# Patient Record
Sex: MALE | Race: WHITE
[De-identification: names, ages, dates, MRNs, and addresses within clinical notes are randomized per-mention and may not be internally consistent; named-entity substitution may affect disease eponyms.]

---

## 2019-06-24 ENCOUNTER — HOSPITAL ENCOUNTER (OUTPATIENT)
Dept: HOSPITAL 92 - SCSMRI | Age: 34
Discharge: HOME | End: 2019-06-24
Attending: FAMILY MEDICINE
Payer: COMMERCIAL

## 2019-06-24 DIAGNOSIS — E29.1: Primary | ICD-10-CM

## 2019-06-24 PROCEDURE — 84436 ASSAY OF TOTAL THYROXINE: CPT

## 2019-06-24 PROCEDURE — 84146 ASSAY OF PROLACTIN: CPT

## 2019-06-24 PROCEDURE — 36415 COLL VENOUS BLD VENIPUNCTURE: CPT

## 2019-06-24 PROCEDURE — 84466 ASSAY OF TRANSFERRIN: CPT

## 2019-06-24 PROCEDURE — A9577 INJ MULTIHANCE: HCPCS

## 2019-06-24 PROCEDURE — 83540 ASSAY OF IRON: CPT

## 2019-06-24 PROCEDURE — 70553 MRI BRAIN STEM W/O & W/DYE: CPT

## 2019-06-24 PROCEDURE — 82533 TOTAL CORTISOL: CPT

## 2019-06-24 NOTE — MRI
MRI BRAIN AND SELLA WITH AND WITHOUT CONTRAST:

 

DATE:  06/24/19 

 

HISTORY:  

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 

Low serum testosterone level. 

 

TECHNIQUE:

Multiple sequences obtained in axial, sagittal, and coronal planes; pre and post IV injection of gado
linium-based contrast agent: 10 mL MultiHance. In addition to whole brain sequences, thin slices were
 obtained through the sella turcica. 

 

FINDINGS:

The ventricles are normal in size and configuration.  There is no major intraaxial signal abnormality
, restricted diffusion, abnormal intraaxial enhancement, mass, midline shift or any other mass effect
, recent intraaxial hemorrhage, or extraaxial fluid collection.

 

The pituitary gland is normal in size, with homogeneous enhancement. No evidence of nodule or abnorma
l enhancement in the sella or suprasellar cistern. Cavernous sinuses are normal. Infundibular stalk a
nd hypothalamus region demonstrate no mass. 

 

IMPRESSION:

Normal.

 

jn[] 

 

POS: CET